# Patient Record
Sex: FEMALE | Race: WHITE | URBAN - METROPOLITAN AREA
[De-identification: names, ages, dates, MRNs, and addresses within clinical notes are randomized per-mention and may not be internally consistent; named-entity substitution may affect disease eponyms.]

---

## 2017-01-05 ENCOUNTER — TRANSFERRED RECORDS (OUTPATIENT)
Dept: HEALTH INFORMATION MANAGEMENT | Facility: CLINIC | Age: 72
End: 2017-01-05

## 2017-01-06 ENCOUNTER — OFFICE VISIT (OUTPATIENT)
Dept: OPHTHALMOLOGY | Facility: CLINIC | Age: 72
End: 2017-01-06
Attending: OPHTHALMOLOGY
Payer: COMMERCIAL

## 2017-01-06 DIAGNOSIS — Z98.890 POST-OPERATIVE STATE: Primary | ICD-10-CM

## 2017-01-06 PROCEDURE — 99212 OFFICE O/P EST SF 10 MIN: CPT | Mod: ZF

## 2017-01-06 RX ORDER — CICLOPIROX OLAMINE 7.7 MG/G
CREAM TOPICAL
COMMUNITY
Start: 2015-12-08

## 2017-01-06 RX ORDER — DEXTROAMPHETAMINE SACCHARATE, AMPHETAMINE ASPARTATE, DEXTROAMPHETAMINE SULFATE AND AMPHETAMINE SULFATE 7.5; 7.5; 7.5; 7.5 MG/1; MG/1; MG/1; MG/1
30 TABLET ORAL
COMMUNITY
Start: 2016-12-18

## 2017-01-06 RX ORDER — GABAPENTIN 600 MG/1
TABLET ORAL
COMMUNITY
Start: 2016-03-16

## 2017-01-06 RX ORDER — TRIAMCINOLONE ACETONIDE 1 MG/G
CREAM TOPICAL
COMMUNITY
Start: 2015-09-17

## 2017-01-06 RX ORDER — LEVOTHYROXINE SODIUM 50 UG/1
TABLET ORAL
COMMUNITY
Start: 2016-12-28

## 2017-01-06 RX ORDER — ARIPIPRAZOLE 5 MG/1
5 TABLET ORAL
COMMUNITY
Start: 2015-03-09

## 2017-01-06 RX ORDER — VENLAFAXINE HYDROCHLORIDE 150 MG/1
300 CAPSULE, EXTENDED RELEASE ORAL
COMMUNITY
Start: 2014-06-10

## 2017-01-06 RX ORDER — AMOXICILLIN 250 MG
CAPSULE ORAL
COMMUNITY
Start: 2013-08-01

## 2017-01-06 RX ORDER — IPRATROPIUM BROMIDE 21 UG/1
SPRAY, METERED NASAL
COMMUNITY
Start: 2016-11-02

## 2017-01-06 RX ORDER — POLYETHYLENE GLYCOL 3350 17 G/17G
POWDER, FOR SOLUTION ORAL
COMMUNITY
Start: 2016-08-05

## 2017-01-06 RX ORDER — LISINOPRIL 5 MG/1
TABLET ORAL
COMMUNITY
Start: 2016-11-07

## 2017-01-06 RX ORDER — BUDESONIDE AND FORMOTEROL FUMARATE DIHYDRATE 160; 4.5 UG/1; UG/1
1 AEROSOL RESPIRATORY (INHALATION)
COMMUNITY
Start: 2016-11-21

## 2017-01-06 RX ORDER — TRETINOIN 1 MG/G
CREAM TOPICAL
COMMUNITY
Start: 2016-04-22

## 2017-01-06 RX ORDER — ALBUTEROL SULFATE 90 UG/1
1-2 AEROSOL, METERED RESPIRATORY (INHALATION)
COMMUNITY
Start: 2016-09-14

## 2017-01-06 RX ORDER — BISACODYL 5 MG/1
TABLET, DELAYED RELEASE ORAL
COMMUNITY
Start: 2016-08-05

## 2017-01-06 RX ORDER — FLUTICASONE PROPIONATE 50 MCG
SPRAY, SUSPENSION (ML) NASAL
COMMUNITY
Start: 2016-11-11

## 2017-01-06 RX ORDER — DAPSONE 75 MG/G
GEL TOPICAL
COMMUNITY
Start: 2016-07-27

## 2017-01-06 RX ORDER — ALBUTEROL SULFATE 0.83 MG/ML
2.5 SOLUTION RESPIRATORY (INHALATION)
COMMUNITY
Start: 2016-09-16

## 2017-01-06 ASSESSMENT — TONOMETRY
IOP_METHOD: ICARE
OS_IOP_MMHG: 26
OD_IOP_MMHG: 17

## 2017-01-06 ASSESSMENT — VISUAL ACUITY
OS_SC+: -3
OD_SC: HM
OS_SC: 20/25
METHOD: SNELLEN - LINEAR

## 2017-01-06 ASSESSMENT — SLIT LAMP EXAM - LIDS: COMMENTS: NORMAL

## 2017-01-06 ASSESSMENT — EXTERNAL EXAM - RIGHT EYE: OD_EXAM: NORMAL

## 2017-01-06 NOTE — PATIENT INSTRUCTIONS
POST-OPERATIVE INSTRUCTIONS FOLLOWING RETINA SURGERY    Cheryle Browne MD  Department of Ophthalmology  [facility]  [phone]      ACTIVITY:    No heavy lifting for 2 weeks after surgery.    No swimming for 3 weeks after surgery.    It is OK for you to shower, to wash your face, and to wash your hair.  Allow the shower to hit the top of your head and wash down your face.  Do not hit your eye directly with the jet from the shower.    Keep your eye covered with the shield when you sleep for 1 week after surgery.  If your shield has a tab, it is designed to go over the bridge of your nose.  Place one piece of tape diagonally from the center of your forehead to the side of your cheek to secure the shield.     During the daytime, you can use either the shield or your regular eyeglasses or sunglasses to protect your eye.    GAS BUBBLE POSITIONING REQUIREMENTS  Positioning requirements will be discussed with you if you have an oil or gas bubble in your eye:    Position:  Avoid sleeping on back    Do not lay flat on your back until the bubble is gone.    Do not fly on an airplane or travel to elevations more than 1000 feet above [place] until the bubble is gone.    Keep the green bracelet on your wrist until the bubble is gone.  If it breaks, we can give you a replacement      EYE DROPS  Use these drops after surgery.  When using more than one drop, separate them by 3 minutes between drops.  Common times to place drops are breakfast, lunch, dinner, and bedtime.  Do not stop your drops without discussing with our office.  If you run out before your appointment, call and we will send in a refill.      Polytrim (polymyxin B / trimethoprim) --- 4 times per day  Pred Forte (prednisolone acetate) --- 4 times per day    WHAT TO EXPECT  It is common for the eye to to have a blood tinged discharge for a few days after surgery  It may feel irritated (as if something were in your eye), for there to be clear  discharge (thicker in the mornings upon awakening), and for it to be bloodshot for 2-3 weeks following retina surgery.   Your vision is also commonly decreased during this time due to the bubble.          WHAT TO WATCH OUT FOR  If you experience any of the following, you should call immediately:    Increasing pain    Increasing nausea or vomiting    Increasing redness    Worsening or darkening of the vision    New flashing lights or floaters      For any of the symptoms listed above, or for other concerns, call [phone] and ask to speak to the clinic nurse.  If you call after hours, follow to prompts to reach the doctor on call.

## 2017-01-06 NOTE — MR AVS SNAPSHOT
After Visit Summary   1/6/2017    Mrs. Una Dorman    MRN: [id number]           Patient Information     Date Of Birth          1945        Visit Information        Provider Department      1/6/2017 9:45 AM Cheryle Browne MD [facility]        Today's Diagnoses     Post-operative state    -  1       Care Instructions    POST-OPERATIVE INSTRUCTIONS FOLLOWING RETINA SURGERY    Cheryle Browne MD  Department of Ophthalmology  [facility]  [phone]      ACTIVITY:    No heavy lifting for 2 weeks after surgery.    No swimming for 3 weeks after surgery.    It is OK for you to shower, to wash your face, and to wash your hair.  Allow the shower to hit the top of your head and wash down your face.  Do not hit your eye directly with the jet from the shower.    Keep your eye covered with the shield when you sleep for 1 week after surgery.  If your shield has a tab, it is designed to go over the bridge of your nose.  Place one piece of tape diagonally from the center of your forehead to the side of your cheek to secure the shield.     During the daytime, you can use either the shield or your regular eyeglasses or sunglasses to protect your eye.    GAS BUBBLE POSITIONING REQUIREMENTS  Positioning requirements will be discussed with you if you have an oil or gas bubble in your eye:    Position:  Avoid sleeping on back    Do not lay flat on your back until the bubble is gone.    Do not fly on an airplane or travel to elevations more than 1000 feet above [place] until the bubble is gone.    Keep the green bracelet on your wrist until the bubble is gone.  If it breaks, we can give you a replacement      EYE DROPS  Use these drops after surgery.  When using more than one drop, separate them by 3 minutes between drops.  Common times to place drops are breakfast, lunch, dinner, and bedtime.  Do not stop your drops without discussing with our office.  If you run out before your  appointment, call and we will send in a refill.      Polytrim (polymyxin B / trimethoprim) --- 4 times per day  Pred Forte (prednisolone acetate) --- 4 times per day    WHAT TO EXPECT  It is common for the eye to to have a blood tinged discharge for a few days after surgery  It may feel irritated (as if something were in your eye), for there to be clear discharge (thicker in the mornings upon awakening), and for it to be bloodshot for 2-3 weeks following retina surgery.   Your vision is also commonly decreased during this time due to the bubble.          WHAT TO WATCH OUT FOR  If you experience any of the following, you should call immediately:    Increasing pain    Increasing nausea or vomiting    Increasing redness    Worsening or darkening of the vision    New flashing lights or floaters      For any of the symptoms listed above, or for other concerns, call [phone] and ask to speak to the clinic nurse.  If you call after hours, follow to prompts to reach the doctor on call.                  Follow-ups after your visit        Follow-up notes from your care team     Return in about 1 week (around 1/13/2017).      Your next 10 appointments already scheduled     Jan 12, 2017  7:30 AM   Post-Op with Leatha Pina MD   Eye Clinic ([facility])    [facility]  [address]  [address] [address]  [facility] [address]   [phone]              Who to contact     Please call your clinic at [phone] to:    Ask questions about your health    Make or cancel appointments    Discuss your medicines    Learn about your test results    Speak to your doctor   If you have compliments or concerns about an experience at your clinic, or if you wish to file a complaint, please contact [facility] Patient Relations at [phone] or email us at [email or web address]         Additional Information About Your Visit        MyChart Information     [employer]ad is an  electronic gateway that provides easy, online access to your medical records. With [employer] [facility], you can request a clinic appointment, read your test results, renew a prescription or communicate with your care team.     To sign up for [employer] [facility] visit the website at [email or web address]   You will be asked to enter the access code listed below, as well as some personal information. Please follow the directions to create your username and password.     Your access code is: [email or web address]  Expires: 3/23/2017  9:00 AM     Your access code will  in 90 days. If you need help or a new code, please contact your [facility] or call [phone] for assistance.        Care EveryWhere ID     This is your Care EveryWhere ID. This could be used by other organizations to access your [place] medical records  [id number]         Blood Pressure from Last 3 Encounters:   No data found for BP    Weight from Last 3 Encounters:   No data found for Wt              Today, you had the following     No orders found for display       Primary Care Provider Office Phone # Fax #    Lu Campos [phone]       [address] [address] [address]  [facility] [address]        Thank you!     Thank you for choosing EYE CLINIC  for your care. Our goal is always to provide you with excellent care. Hearing back from our patients is one way we can continue to improve our services. Please take a few minutes to complete the written survey that you may receive in the mail after your visit with us. Thank you!             Your Updated Medication List - Protect others around you: Learn how to safely use, store and throw away your medicines at www.disposemymeds.org.          This list is accurate as of: 17 11:20 AM.  Always use your most recent med list.                   Brand Name Dispense Instructions for use    * albuterol 108 (90 BASE) MCG/ACT Inhaler    PROAIR HFA/PROVENTIL  HFA/VENTOLIN HFA     Inhale 1-2 puffs into the lungs       * albuterol (2.5 MG/3ML) 0.083% neb solution      Inhale 2.5 mg into the lungs       amphetamine-dextroamphetamine 30 MG per tablet    ADDERALL     Take 30 mg by mouth       ARIPiprazole 5 MG tablet    ABILIFY     Take 5 mg by mouth       bisacodyl 5 MG EC tablet    DULCOLAX         budesonide-formoterol 160-4.5 MCG/ACT Inhaler    SYMBICORT     Inhale 1 puff into the lungs       ciclopirox 0.77 % cream    LOPROX         dapsone 7.5 % gel    ACZONE         fluticasone 50 MCG/ACT spray    FLONASE         gabapentin 600 MG tablet    NEURONTIN         ipratropium 0.03 % spray    ATROVENT         levothyroxine 50 MCG tablet    SYNTHROID/LEVOTHROID         lisinopril 5 MG tablet    PRINIVIL/ZESTRIL         polyethylene glycol powder    MIRALAX/GLYCOLAX         senna-docusate 8.6-50 MG per tablet    SENOKOT-S;PERICOLACE         tretinoin 0.1 % cream    RETIN-A         triamcinolone 0.1 % cream    KENALOG         venlafaxine 150 MG 24 hr capsule    EFFEXOR-XR     Take 300 mg by mouth       * Notice:  This list has 2 medication(s) that are the same as other medications prescribed for you. Read the directions carefully, and ask your doctor or other care provider to review them with you.

## 2017-01-06 NOTE — PROGRESS NOTES
"CC: post-op right eye    HPI: Una Dorman is a 71 year old female POD#1 OD s/p PPV/MP/FAX for ERM reports feeling \"excellent\" today with bubble in vision, but good vision above bubble.  Seen by me at     [facility]:  PPV/MP OD (1/5/17)      RETINAL IMAGING  None today    ASSESSMENT & PLAN:  1.  POD#1 OD    - s/p PPV/MP/FAX 1/5/16 for ERM   - doing well, IOP good, retina attached, no evidence of infection   - PT/PF 4x daily   - air bubble/post-op restrictions d/w patient    2.  Ocular Hypertension, left eye   - with + FHx of glaucoma   - recommend evaluation with Comprehensive Ophthalmologist in next few months, either here or at [facility]    3. vitreous syneresis OS    4. NS OU    return to clinic: 1 week as scheduled at [facility]    Michael Dougherty MD  Retina Fellow        ATTESTATION     Attending Physician Attestation:     Complete documentation of historical and exam elements from today's encounter can be found in the full encounter summary report (not redupilcated in this progress note).  I personally obtained the chief complaint(s) and hisotry of present illness., I have confirmed and edited as necessary the CC, HPI, PMH/PSH, Social history, FMH,  ROS, and exam/neuro findings as obtained by the technician or others. I have examined this patient myself.  and I personally viewed the image(s) and studies listed above and the documentation reflects my findings and interpretation.    Cheryle Browne MD, PhD  , Vitreoretinal Surgery  Department of Ophthalmology  [facility]        "

## 2017-01-12 ENCOUNTER — OFFICE VISIT (OUTPATIENT)
Dept: OPHTHALMOLOGY | Facility: CLINIC | Age: 72
End: 2017-01-12
Attending: OPHTHALMOLOGY
Payer: COMMERCIAL

## 2017-01-12 DIAGNOSIS — Z48.810 AFTERCARE FOLLOWING SURGERY OF A SENSORY ORGAN: Primary | ICD-10-CM

## 2017-01-12 PROCEDURE — 99213 OFFICE O/P EST LOW 20 MIN: CPT | Mod: ZF

## 2017-01-12 RX ORDER — POLYMYXIN B SULFATE AND TRIMETHOPRIM 1; 10000 MG/ML; [USP'U]/ML
1 SOLUTION OPHTHALMIC
COMMUNITY
Start: 2017-01-05

## 2017-01-12 RX ORDER — PREDNISOLONE ACETATE 10 MG/ML
1 SUSPENSION/ DROPS OPHTHALMIC
COMMUNITY
Start: 2017-01-05

## 2017-01-12 ASSESSMENT — SLIT LAMP EXAM - LIDS: COMMENTS: NORMAL

## 2017-01-12 ASSESSMENT — VISUAL ACUITY
OD_SC+: +1
OD_SC: 20/80
OS_SC: 20/20
METHOD: SNELLEN - LINEAR
OS_SC+: -2

## 2017-01-12 ASSESSMENT — EXTERNAL EXAM - RIGHT EYE: OD_EXAM: NORMAL

## 2017-01-12 ASSESSMENT — TONOMETRY
OD_IOP_MMHG: 17
OS_IOP_MMHG: 21
IOP_METHOD: TONOPEN

## 2017-01-12 NOTE — NURSING NOTE
Chief Complaints and History of Present Illnesses   Patient presents with     Post Op (Ophthalmology) Right Eye     1 week POP right eye PPV/MP/FAX for ERM (1-5-17)     HPI    Affected eye(s):  Right   Symptoms:     Floaters (Comment: right eye)   No flashes      Duration:  1 week      Do you have eye pain now?:  No      Comments:  1 week s/p PPV/MP/FAX for ERM, right eye  Pt reports vision is excellent  Pt still notes a small amount of the bubble    Alyssa Smith COA 7:33 AM January 12, 2017

## 2017-01-12 NOTE — PROGRESS NOTES
CC: post-op right eye    HPI: Una Dorman is a 71 year old female POW#1 OD s/p PPV/MP/FAX for ERM reports that vision is continuing to steadily improve. Still seeing air bubble.   [facility]      RETINAL IMAGING  None today    ASSESSMENT & PLAN:  1.  POW#1 OD    - s/p PPV/MP/FAX 1/5/16 for ERM   - doing well, IOP good, retina attached, no evidence of infection   -VA significantly improved- 20/80 today. Previous baseline of ~20/200    - PT/PF 4x daily- begin taper. To TID x 1 week, BID x 1 week, daily x 1 week, then stop.   - air bubble/post-op restrictions d/w patient    2.  Ocular Hypertension, left eye   - with + FHx of glaucoma   -IOP improved today, borderline at 21   - recommend evaluation with Comprehensive Ophthalmologist in next few months, either here or at [facility]    3. vitreous syneresis OS    4. NS OU    return to clinic: 3-4 weeks, with OCT right eye.     Abdirahman Ta MD  PGY3, Dept of Ophthalmology    ~~~~~~~~~~~~~~~~~~~~~~~~~~~~~~~~~~~~~~~~~~~~~~~~~~~~~~~~~~~~~~~~~~~~~~~~~~~~~~~~~~  Attending Physician Attestation:  Complete documentation of historical and exam elements from today's encounter can be found in the full encounter summary report (not reduplicated in this progress note).  I personally obtained the chief complaint(s) and history of present illness.  I confirmed and edited as necessary the review of systems, past medical/surgical history, family history, social history, and examination findings as documented by others; and I examined the patient myself.  I personally reviewed the relevant tests, images, and reports as documented above and I agree with the interpretation as documented by the resident/fellow and edited by me.  I formulated and edited as necessary the assessment and plan and discussed the findings and management plan with the patient and family . In addition, when a procedure was performed, I was present for critical portions  of the procedure and was immediately available for the entire procedure. - Leatha Pina M.D.